# Patient Record
Sex: MALE | Race: WHITE | NOT HISPANIC OR LATINO | Employment: FULL TIME | URBAN - NONMETROPOLITAN AREA
[De-identification: names, ages, dates, MRNs, and addresses within clinical notes are randomized per-mention and may not be internally consistent; named-entity substitution may affect disease eponyms.]

---

## 2018-10-29 ENCOUNTER — OFFICE VISIT (OUTPATIENT)
Dept: OTOLARYNGOLOGY | Facility: CLINIC | Age: 63
End: 2018-10-29

## 2018-10-29 VITALS — RESPIRATION RATE: 17 BRPM | WEIGHT: 264.4 LBS | BODY MASS INDEX: 35.81 KG/M2 | HEIGHT: 72 IN

## 2018-10-29 DIAGNOSIS — C44.41 BASAL CELL CARCINOMA (BCC) OF SKIN OF NECK: Primary | ICD-10-CM

## 2018-10-29 PROCEDURE — 99243 OFF/OP CNSLTJ NEW/EST LOW 30: CPT | Performed by: OTOLARYNGOLOGY

## 2018-10-30 NOTE — PROGRESS NOTES
Subjective   Arnaud Cruz is a 63 y.o. male.   This is a consultation from Dr. Prem Blas  History of Present Illness   Patient has a skin lesion on his left neck that he says has been present for 2 months but was reportedly previously resected last year and then recurred.  Nothing in particular seems to have brought it back.  Underwent a biopsy at Dr. Blas's office (his records are available and are personally reviewed) and pathology revealed ulcerated basal cell carcinoma, nodular type, extending to the deep and peripheral margins.  Patient reports that since the biopsy there is still a crust on this site but is not bleeding or painful.      The following portions of the patient's history were reviewed and updated as appropriate: allergies, current medications, past family history, past medical history, past social history, past surgical history and problem list.      Arnaud Cruz reports that he has never smoked. He uses smokeless tobacco. He reports that he drinks alcohol. He reports that he does not use drugs.  Patient is not a tobacco user and has not been counseled for use of tobacco products    Family History   Problem Relation Age of Onset   • Hypertension Mother    • Cancer Mother         colon cancer   • Hypertension Father    • Cancer Father        Allergies   Allergen Reactions   • Penicillins Hives and Swelling         Current Outpatient Prescriptions:   •  HYDROcodone-acetaminophen (NORCO) 7.5-325 MG per tablet, Take 1 tablet by mouth 3 (Three) Times a Day As Needed for Moderate Pain ., Disp: , Rfl:   •  lisinopril (PRINIVIL,ZESTRIL) 20 MG tablet, Take 20 mg by mouth Daily., Disp: , Rfl:   •  ondansetron ODT (ZOFRAN-ODT) 4 MG disintegrating tablet, Take 1 tablet by mouth Every 6 (Six) Hours As Needed for Nausea or Vomiting., Disp: 12 tablet, Rfl: 0    Past Medical History:   Diagnosis Date   • Back pain    • Hypertension          Review of Systems   Respiratory: Positive for cough  and shortness of breath.    Musculoskeletal: Positive for arthralgias and back pain.   Hematological: Bruises/bleeds easily.   All other systems reviewed and are negative.          Objective   Physical Exam    General: Well-developed well-nourished male in no acute distress.  Alert and oriented ×3. Head: Normocephalic. Face: Symmetrical strength and appearance. PERRL. EOMI. Voice:Strong. Speech:Fluent  Ears: External ears no deformity, canals wax that is not addressed today.  Tympanic membranes are not visualized.  Nose: Nares show no discharge mass polyp or purulence.  Boggy mucosa is present.  No gross external deformity.  Septum: Midline  Oral cavity: Lips and gums without lesions.  Tongue and floor of mouth without lesions.  Parotid and submandibular ducts unobstructed.  No mucosal lesions on the buccal mucosa or vestibule of the mouth.  Pharynx: No erythema exudate mass or ulcer  Neck: No lymphadenopathy.  No thyromegaly.  Trachea and larynx midline.  No masses in the parotid or submandibular glands.  Skin: On the left neck, corresponding to the area indicated in Dr. Blas's notes, is a 1.3 x 1.5 cm biopsy site was central crusting suggestive of residual disease.    Assessment/Plan   Arnuad was seen today for skin lesion.    Diagnoses and all orders for this visit:    Basal cell carcinoma (BCC) of skin of neck      Plan:I have offered to perform excision of the skin lesion with frozen section margin control if indicated, and possible flap or skin graft if needed for closure.  I have explained the nature of this procedure to the patient in layman's terms including risks of bleeding, infection, poor healing, poor appearance, numbness, recurrence, and possible need for further treatment depending on final pathology.  Proposed benefit would be definitive treatment of the identified lesion.  The alternative would be observation which could result in continued or recurrent growth of the lesions.  Patient voices  understanding of all of the above and wishes to proceed.  This will be scheduled.    My thanks to Dr. Blas for this consultation

## 2018-11-05 ENCOUNTER — CONSULT (OUTPATIENT)
Dept: SURGERY | Facility: CLINIC | Age: 63
End: 2018-11-05

## 2018-11-05 VITALS
HEIGHT: 72 IN | SYSTOLIC BLOOD PRESSURE: 118 MMHG | BODY MASS INDEX: 36.08 KG/M2 | HEART RATE: 78 BPM | WEIGHT: 266.4 LBS | DIASTOLIC BLOOD PRESSURE: 82 MMHG | TEMPERATURE: 97.5 F

## 2018-11-05 DIAGNOSIS — C44.90 SKIN CANCER: Primary | ICD-10-CM

## 2018-11-05 PROCEDURE — 99203 OFFICE O/P NEW LOW 30 MIN: CPT | Performed by: SURGERY

## 2018-11-05 NOTE — PATIENT INSTRUCTIONS

## 2018-11-05 NOTE — PROGRESS NOTES
Subjective   Arnaud Cruz is a 63 y.o. male     History of Present Illness recently underwent a shave biopsy lesion on his left mid back by dermatology and was found to have a positive deep and peripheral margin.  This was nodular and infiltrative type.  Patient also had a basal cell cancer on his neck which she's seeing Dr. Middleton for excision.  Patient has had other skin cancers had one on his back excised a few years ago.        Review of Systems   Constitutional: Negative.    HENT: Negative.    Eyes: Negative.    Respiratory: Positive for shortness of breath.    Cardiovascular: Negative.    Gastrointestinal: Negative.    Endocrine: Negative.    Genitourinary: Negative.    Musculoskeletal: Positive for arthralgias and back pain.   Skin: Negative.    Allergic/Immunologic: Negative.    Neurological: Negative.    Hematological: Negative.    Psychiatric/Behavioral: Negative.      Past Medical History:   Diagnosis Date   • Back pain    • Hypertension      Past Surgical History:   Procedure Laterality Date   • BACK SURGERY     • HERNIA REPAIR       Family History   Problem Relation Age of Onset   • Hypertension Mother    • Cancer Mother         colon cancer   • Hypertension Father    • Cancer Father      Social History     Social History   • Marital status:      Spouse name: N/A   • Number of children: N/A   • Years of education: N/A     Occupational History   • Not on file.     Social History Main Topics   • Smoking status: Never Smoker   • Smokeless tobacco: Current User     Types: Snuff   • Alcohol use Yes      Comment: occasional    • Drug use: No   • Sexual activity: Not on file     Other Topics Concern   • Not on file     Social History Narrative   • No narrative on file     Allergies   Allergen Reactions   • Penicillins Hives and Swelling       Home Medications:  Prior to Admission medications    Medication Sig Start Date End Date Taking? Authorizing Provider   HYDROcodone-acetaminophen (NORCO)  7.5-325 MG per tablet Take 1 tablet by mouth 3 (Three) Times a Day As Needed for Moderate Pain .   Yes Emergency, Nurse BECKY De La Vega   lisinopril (PRINIVIL,ZESTRIL) 20 MG tablet Take 20 mg by mouth Daily.   Yes Emergency, Nurse Epic, RN   Omeprazole 20 MG Tablet Delayed Release Dispersible Take 20 mg by mouth Daily.   Yes Provider, MD Jimmy   ondansetron ODT (ZOFRAN-ODT) 4 MG disintegrating tablet Take 1 tablet by mouth Every 6 (Six) Hours As Needed for Nausea or Vomiting. 1/15/18   Juany Sanders PA-C       Objective   Physical Exam   Constitutional: He is oriented to person, place, and time. He appears well-developed and well-nourished. No distress.   HENT:   Head: Normocephalic and atraumatic.   Nose: Nose normal.   Eyes: Conjunctivae are normal.   Neck: Normal range of motion. No tracheal deviation present. No thyromegaly present.   Cardiovascular: Normal rate, regular rhythm and normal heart sounds.    No murmur heard.  Pulmonary/Chest: Effort normal and breath sounds normal. No respiratory distress. He has no wheezes. He has no rales. He exhibits no tenderness.   Abdominal: Soft. He exhibits no distension. There is no tenderness. There is no rebound and no guarding. No hernia.   Musculoskeletal: He exhibits no tenderness or deformity.   Neurological: He is alert and oriented to person, place, and time.   Skin: Skin is warm and dry. No rash noted.   Psychiatric: He has a normal mood and affect. His behavior is normal. Judgment and thought content normal.   Vitals reviewed.   no adenopathy in his left axilla or left groin  Has a 2.5 x 1 cm open wound on his left mid back      Assessment/Plan basal cell cancer shave biopsy with positive margins needs reexcision.  I fully discussed this with the patient doing this in the office versus the operating room and he wishes to the office under local.  He clearly understands the alternatives risk and benefits and wishes to proceed      The encounter diagnosis was Skin  cancer.                     This document has been electronically signed by Felipe De Oliveira MD on November 5, 2018 4:21 PM

## 2018-11-14 ENCOUNTER — PROCEDURE VISIT (OUTPATIENT)
Dept: SURGERY | Facility: CLINIC | Age: 63
End: 2018-11-14

## 2018-11-14 VITALS
TEMPERATURE: 98.2 F | HEART RATE: 84 BPM | WEIGHT: 265 LBS | BODY MASS INDEX: 35.89 KG/M2 | HEIGHT: 72 IN | DIASTOLIC BLOOD PRESSURE: 88 MMHG | SYSTOLIC BLOOD PRESSURE: 140 MMHG

## 2018-11-14 DIAGNOSIS — C44.519 BASAL CELL CARCINOMA OF BACK: Primary | ICD-10-CM

## 2018-11-14 PROCEDURE — 88305 TISSUE EXAM BY PATHOLOGIST: CPT | Performed by: SURGERY

## 2018-11-14 PROCEDURE — 88305 TISSUE EXAM BY PATHOLOGIST: CPT | Performed by: PATHOLOGY

## 2018-11-14 PROCEDURE — 11606 EXC TR-EXT MAL+MARG >4 CM: CPT | Performed by: SURGERY

## 2018-11-14 NOTE — PATIENT INSTRUCTIONS

## 2018-11-14 NOTE — PROGRESS NOTES
63-year-old male returns to have a basal cell cancer biopsy site on his back re-excise.  Patient wished to have it done here in the office stating under local.  We again went over with him what's involved along with alternatives risk and benefits and he clearly understands and wishes undergo reexcision of this biopsy site from his back.  The previous biopsy was done by dermatology and it had a positive at the deep and peripheral margin.  It was a nodular infiltrative type of basal cell cancer.    Patient was placed prone and the area was prepped and draped with the normal sterile fashion.  We infiltrated local good block was obtained.  We then excised an ellipse of skin with a scalpel full-thickness the skin down to the subcutaneous and came across normal subcutaneous with the scalpel at the deep level of the subcutaneous fat.  Specimen was handed off and sutures placed at the upper margin.  There was irrigated and hemostasis assured with light cautery we then closed the wound with interrupted 3-0 Vicryl simple stitches and a running 4-0 Monocryl subject her stitch.  Steri-Strips were applied patient was instructed in local wound care and he will follow up with us in the office in 2 weeks or sooner if he has a further concerns or questions.  The incision at the completion was 6 cm in length

## 2018-11-16 LAB
LAB AP CASE REPORT: NORMAL
LAB AP CLINICAL INFORMATION: NORMAL
PATH REPORT.FINAL DX SPEC: NORMAL
PATH REPORT.GROSS SPEC: NORMAL

## 2018-11-19 ENCOUNTER — PROCEDURE VISIT (OUTPATIENT)
Dept: OTOLARYNGOLOGY | Facility: CLINIC | Age: 63
End: 2018-11-19

## 2018-11-19 VITALS — HEIGHT: 72 IN | WEIGHT: 265 LBS | BODY MASS INDEX: 35.89 KG/M2 | OXYGEN SATURATION: 97 %

## 2018-11-19 DIAGNOSIS — C44.41 BASAL CELL CARCINOMA (BCC) OF SKIN OF NECK: Primary | ICD-10-CM

## 2018-11-19 PROCEDURE — 88332 PATH CONSLTJ SURG EA ADD BLK: CPT | Performed by: OTOLARYNGOLOGY

## 2018-11-19 PROCEDURE — 88332 PATH CONSLTJ SURG EA ADD BLK: CPT | Performed by: PATHOLOGY

## 2018-11-19 PROCEDURE — 88305 TISSUE EXAM BY PATHOLOGIST: CPT | Performed by: PATHOLOGY

## 2018-11-19 PROCEDURE — 88305 TISSUE EXAM BY PATHOLOGIST: CPT | Performed by: OTOLARYNGOLOGY

## 2018-11-19 PROCEDURE — 12042 INTMD RPR N-HF/GENIT2.6-7.5: CPT | Performed by: OTOLARYNGOLOGY

## 2018-11-19 PROCEDURE — 88331 PATH CONSLTJ SURG 1 BLK 1SPC: CPT | Performed by: OTOLARYNGOLOGY

## 2018-11-19 PROCEDURE — 88331 PATH CONSLTJ SURG 1 BLK 1SPC: CPT | Performed by: PATHOLOGY

## 2018-11-19 PROCEDURE — 11622 EXC S/N/H/F/G MAL+MRG 1.1-2: CPT | Performed by: OTOLARYNGOLOGY

## 2018-11-20 LAB
LAB AP CASE REPORT: NORMAL
Lab: NORMAL
PATH REPORT.FINAL DX SPEC: NORMAL
PATH REPORT.GROSS SPEC: NORMAL

## 2018-11-20 NOTE — PROGRESS NOTES
Procedure note    Preop diagnosis: Previously biopsied basal cell carcinoma of the left neck    Postop diagnosis: Same    Procedure: Excision of basal cell carcinoma left neck 1.7 cm margin with 5.0 cm layered closure    Surgeon: Dr. Middleton    Anesthesia: 1% Xylocaine with epinephrine    Description of procedure: The previous biopsy site in the left neck was identified and confirmed by review of Dr. Blas's notes and confirmed by the patient.  After again explaining the nature of the procedure to the patient in layman's terms including risks of bleeding, infection, poor healing, poor appearance, numbness, recurrence, possible need for further treatment depending on final pathology and the benefit of definitive treatment of a known malignancy and the alternative of observation informed consent was confirmed.  An elliptical incision was designed to follow pre-existing skin folds.  This was cleansed with alcohol and infiltrated 1% Xylocaine with epinephrine and prepped and draped sterilely.  Lesion was excised sharply, full-thickness, marked with a suture for orientation and sent to pathology for frozen section.  All awaiting frozen section the wound was thoroughly irrigated with saline and subcutaneous tissues were reapproximated with interrupted 4-0 Vicryl.  A saline soaked gauze dressing was applied to the wound follow awaiting frozen section.  Frozen section returned basal cell carcinoma with clear margins.  Wound was uncovered, further irrigated with saline, additional subcutaneous closure was accomplished with interrupted 5-0 Vicryl, and the skin was closed with running 4-0 nylon.  Bacitracin ointment was applied and procedure was terminated.  Patient tolerated the procedure well.  He was instructed local wound care and advised to return in 1 week for suture removal.

## 2018-11-27 ENCOUNTER — OFFICE VISIT (OUTPATIENT)
Dept: OTOLARYNGOLOGY | Facility: CLINIC | Age: 63
End: 2018-11-27

## 2018-11-27 VITALS — HEIGHT: 72 IN | OXYGEN SATURATION: 95 % | WEIGHT: 265 LBS | BODY MASS INDEX: 35.89 KG/M2

## 2018-11-27 DIAGNOSIS — Z48.817 AFTERCARE FOLLOWING SURGERY OF THE SKIN OR SUBCUTANEOUS TISSUE: Primary | ICD-10-CM

## 2018-11-27 PROCEDURE — 99024 POSTOP FOLLOW-UP VISIT: CPT | Performed by: OTOLARYNGOLOGY

## 2018-11-27 NOTE — PROGRESS NOTES
Subjective   Arnaud Cruz is a 63 y.o. male.       History of Present Illness   Patient is status post excision of a previously biopsied basal cell carcinoma of the left neck.  Final pathology showed clear margins.  He is having no specific issues.      The following portions of the patient's history were reviewed and updated as appropriate: allergies, current medications, past family history, past medical history, past social history, past surgical history and problem list.     reports that  has never smoked. His smokeless tobacco use includes snuff. He reports that he drinks alcohol. He reports that he does not use drugs.   Patient is not a tobacco user and has not been counseled for use of tobacco products      Review of Systems        Objective   Physical Exam  Incision intact, no evidence of infection.  All external sutures are removed      Assessment/Plan   Arnaud was seen today for post-op.    Diagnoses and all orders for this visit:    Aftercare following surgery of the skin or subcutaneous tissue        Plan: Sutures removed as described above.  May follow up with up with me as needed.

## 2018-11-28 ENCOUNTER — OFFICE VISIT (OUTPATIENT)
Dept: SURGERY | Facility: CLINIC | Age: 63
End: 2018-11-28

## 2018-11-28 VITALS
TEMPERATURE: 98 F | BODY MASS INDEX: 35.76 KG/M2 | HEART RATE: 78 BPM | WEIGHT: 264 LBS | HEIGHT: 72 IN | SYSTOLIC BLOOD PRESSURE: 124 MMHG | DIASTOLIC BLOOD PRESSURE: 70 MMHG

## 2018-11-28 DIAGNOSIS — C44.90 SKIN CANCER: Primary | ICD-10-CM

## 2018-11-28 PROCEDURE — 99024 POSTOP FOLLOW-UP VISIT: CPT | Performed by: SURGERY

## 2018-11-28 NOTE — PATIENT INSTRUCTIONS

## 2018-11-28 NOTE — PROGRESS NOTES
See prior note.  Patient returns after excision of a basal cell cancer from his back.  Lesion was completely excised margins were negative.  Final Diagnosis   SKIN, LEFT MIDDLE BACK, EXCISION:  BASAL CELL CARCINOMA, ULCERATED.  FIBROSIS AND FOREIGN BODY REACTION, CONSISTENT WITH RECENT BIOPSY.  MARGINS OF EXCISION FREE OF CARCINOMA.   Electronically signed by Arcadio Timmons MD on 11/16/2018 at 1403      Patient also was sent with another dermatology requests after he underwent biopsy of a basal cell cancer on his right shoulder by shave biopsy.  Deep and lateral margins were both positive patient needs to have this site reexcised.  He clearly identify the site.  I would recommend he have this site reexcised as well and he understands supple set him up to do this in the office under local.  Other lesions on his neck and face he does not have this addressed by Dr. Middleton from ENT

## 2018-11-30 ENCOUNTER — OFFICE VISIT (OUTPATIENT)
Dept: OTOLARYNGOLOGY | Facility: CLINIC | Age: 63
End: 2018-11-30

## 2018-11-30 VITALS — BODY MASS INDEX: 35.76 KG/M2 | WEIGHT: 264 LBS | OXYGEN SATURATION: 97 % | HEIGHT: 72 IN

## 2018-11-30 DIAGNOSIS — C44.320 SQUAMOUS CELL CARCINOMA OF SKIN OF FACE: Primary | ICD-10-CM

## 2018-11-30 PROCEDURE — 99213 OFFICE O/P EST LOW 20 MIN: CPT | Performed by: OTOLARYNGOLOGY

## 2018-11-30 NOTE — PROGRESS NOTES
Subjective   Arnaud Cruz is a 63 y.o. male.       History of Present Illness   Patient recently underwent excision of a basal cell carcinoma of the left neck.  Returns today with a report of biopsied squamous cell carcinoma of the right face.  Dr. Blas's notes are available and reviewed.  Patient reports the lesion is not bleeding or painful following the biopsy.  He is not sure how long the lesion and been there.      The following portions of the patient's history were reviewed and updated as appropriate: allergies, current medications, past family history, past medical history, past social history, past surgical history and problem list.     reports that  has never smoked. His smokeless tobacco use includes snuff. He reports that he drinks alcohol. He reports that he does not use drugs.   Patient is not a tobacco user and has not been counseled for use of tobacco products      Review of Systems   Constitutional: Negative for fever.           Objective   Physical Exam  Skin: Left neck incision is healing nicely with no evidence of infection.  Right face in the preauricular skin shows a 0.7 x 0.5 cm biopsy site.  This corresponds to the site noted in Dr. Blas's notes.      Assessment/Plan   Arnaud was seen today for skin lesion.    Diagnoses and all orders for this visit:    Squamous cell carcinoma of skin of face      Plan:I have offered to perform excision of the skin lesion with frozen section margin control if indicated, and possible flap or skin graft if needed for closure.  I have explained the nature of this procedure to the patient in layman's terms including risks of bleeding, infection, poor healing, poor appearance, numbness, recurrence, and possible need for further treatment depending on final pathology.  Proposed benefit would be definitive treatment of the identified lesions.  The alternative would be observation which could result in continued or recurrent growth of the lesion.  Patient  voices understanding of all of the above and wishes to proceed.  This will be scheduled.

## 2018-12-05 ENCOUNTER — PROCEDURE VISIT (OUTPATIENT)
Dept: OTOLARYNGOLOGY | Facility: CLINIC | Age: 63
End: 2018-12-05

## 2018-12-05 ENCOUNTER — PROCEDURE VISIT (OUTPATIENT)
Dept: SURGERY | Facility: CLINIC | Age: 63
End: 2018-12-05

## 2018-12-05 VITALS
HEIGHT: 72 IN | WEIGHT: 263 LBS | HEART RATE: 80 BPM | SYSTOLIC BLOOD PRESSURE: 126 MMHG | DIASTOLIC BLOOD PRESSURE: 80 MMHG | BODY MASS INDEX: 35.62 KG/M2 | TEMPERATURE: 97.9 F

## 2018-12-05 VITALS — BODY MASS INDEX: 35.62 KG/M2 | HEIGHT: 72 IN | WEIGHT: 263 LBS | OXYGEN SATURATION: 98 %

## 2018-12-05 DIAGNOSIS — C44.612 BASAL CELL CARCINOMA OF SHOULDER, RIGHT: Primary | ICD-10-CM

## 2018-12-05 DIAGNOSIS — C44.320 SQUAMOUS CELL CARCINOMA OF SKIN OF FACE: Primary | ICD-10-CM

## 2018-12-05 PROCEDURE — 88332 PATH CONSLTJ SURG EA ADD BLK: CPT | Performed by: OTOLARYNGOLOGY

## 2018-12-05 PROCEDURE — 12052 INTMD RPR FACE/MM 2.6-5.0 CM: CPT | Performed by: OTOLARYNGOLOGY

## 2018-12-05 PROCEDURE — 88332 PATH CONSLTJ SURG EA ADD BLK: CPT | Performed by: PATHOLOGY

## 2018-12-05 PROCEDURE — 11604 EXC TR-EXT MAL+MARG 3.1-4 CM: CPT | Performed by: SURGERY

## 2018-12-05 PROCEDURE — 88305 TISSUE EXAM BY PATHOLOGIST: CPT | Performed by: PATHOLOGY

## 2018-12-05 PROCEDURE — 88331 PATH CONSLTJ SURG 1 BLK 1SPC: CPT | Performed by: PATHOLOGY

## 2018-12-05 PROCEDURE — 11641 EXC F/E/E/N/L MAL+MRG 0.6-1: CPT | Performed by: OTOLARYNGOLOGY

## 2018-12-05 PROCEDURE — 88305 TISSUE EXAM BY PATHOLOGIST: CPT | Performed by: OTOLARYNGOLOGY

## 2018-12-05 PROCEDURE — 88305 TISSUE EXAM BY PATHOLOGIST: CPT | Performed by: SURGERY

## 2018-12-05 PROCEDURE — 88331 PATH CONSLTJ SURG 1 BLK 1SPC: CPT | Performed by: OTOLARYNGOLOGY

## 2018-12-05 NOTE — PATIENT INSTRUCTIONS

## 2018-12-05 NOTE — PROGRESS NOTES
See prior note.  Patient presents now to have a biopsy site on his right shoulder reexcised it was a basal cell cancer.  This was done by dermatology.  Patient went by dermatology this morning and a marked the site clearly.  Again went over the patient and would do this under local here in the office and he clearly understands and wishes to proceed.  Due to the orientation of the scar we did a transverse elliptical incision area we prepped the area with Betadine infiltrated local and a good block was obtained.  We then used a scalpel to incise full-thickness of the skin down to the subcutaneous fat and then came across subcutaneous fat with a scalpel and with the Bovie.  Bleeding was controlled with the Bovie as we did this.  Specimen was handed off.  We mobilized flaps proximally and distally with light cautery at the level of the subcutaneous just above the muscle.  We then approximated the wound with a few interrupted 4-0 Monocryl subject her stitches and then with 4-0 nylon simple stitches.  Dressing was applied patient was instructed in local wound care.  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  Patient will follow up with us in the office on Tuesday or sooner if he has a further concerns or questions.  At the completion the incision was 3.5 cm in length.

## 2018-12-06 NOTE — PROGRESS NOTES
Procedure note    Preop diagnosis: Previously biopsied squamous cell carcinoma the right face    Postop diagnosis: Same    Procedure: Excision of previously biopsied squamous cell carcinoma of the right face 0.9 cm margin with 2.8 cm layered closure    Surgeon: Dr. Middleton    Anesthesia: 1% Xylocaine with epinephrine    Description of procedure: After the previous biopsy site was identified and confirmed both by the patient and review of notes I once again discussed the nature of the procedure with the patient including risks of bleeding, infection, poor healing, poor appearance, numbness, recurrence, and possible need for further treatment planning on final pathology as well as proposed benefit of definitive treatment of biopsy-proven malignancy and the alternative of observation.  Patient voiced understanding and confirmed consent.  Skin around the lesion was cleansed with alcohol and elliptical incision was designed and infiltrated with 1% Xylocaine with epinephrine.  The skin was prepped and draped sterilely.  Lesion was excised sharply, full-thickness, marked with a suture for orientation and sent to pathology for frozen section.  While awaiting frozen section the wound was irrigated with saline and the subcutaneous tissues were closed with interrupted 5-0 Vicryl.  A saline soaked dressing was applied to the wound follow awaiting frozen section.  Frozen section revealed no residual tumor with solar elastosis and actinic keratosis present.  The wound was uncovered, irrigated with saline, and the skin was closed with running 5-0 nylon.  Bacitracin was applied and the procedure was terminated patient, procedure well.  He was instructed local wound care and advised to return in 1 week for suture removal.

## 2018-12-11 ENCOUNTER — OFFICE VISIT (OUTPATIENT)
Dept: SURGERY | Facility: CLINIC | Age: 63
End: 2018-12-11

## 2018-12-11 VITALS
HEIGHT: 72 IN | SYSTOLIC BLOOD PRESSURE: 142 MMHG | WEIGHT: 266.8 LBS | DIASTOLIC BLOOD PRESSURE: 84 MMHG | HEART RATE: 96 BPM | TEMPERATURE: 98 F | BODY MASS INDEX: 36.14 KG/M2

## 2018-12-11 DIAGNOSIS — C44.90 SKIN CANCER: Primary | ICD-10-CM

## 2018-12-11 LAB
LAB AP CASE REPORT: NORMAL
PATH REPORT.FINAL DX SPEC: NORMAL
PATH REPORT.GROSS SPEC: NORMAL

## 2018-12-11 PROCEDURE — 99024 POSTOP FOLLOW-UP VISIT: CPT | Performed by: SURGERY

## 2018-12-11 NOTE — PROGRESS NOTES
63-year-old male 1 week out from excision of a basal cell cancer excision site on his right shoulder.  Patient is doing well.  Pathology was no residual tumor present.  Went over pathology with the patient.  Wound appears to be intact.  We removed every other stitch.  Patient return to clinic in 1 week for removal rest stitches.

## 2018-12-11 NOTE — PATIENT INSTRUCTIONS

## 2018-12-12 ENCOUNTER — OFFICE VISIT (OUTPATIENT)
Dept: OTOLARYNGOLOGY | Facility: CLINIC | Age: 63
End: 2018-12-12

## 2018-12-12 VITALS — BODY MASS INDEX: 36.03 KG/M2 | WEIGHT: 266 LBS | HEIGHT: 72 IN | RESPIRATION RATE: 17 BRPM

## 2018-12-12 DIAGNOSIS — Z48.817 AFTERCARE FOLLOWING SURGERY OF THE SKIN OR SUBCUTANEOUS TISSUE: Primary | ICD-10-CM

## 2018-12-12 PROCEDURE — 99024 POSTOP FOLLOW-UP VISIT: CPT | Performed by: OTOLARYNGOLOGY

## 2018-12-12 NOTE — PROGRESS NOTES
Subjective   Arnaud Cruz is a 63 y.o. male.       History of Present Illness   Patient is now 1 week status post excision of a previously biopsied squamous cell carcinoma the right face.  Final pathology showed actinic keratosis and marked solar elastosis with evidence of a previous biopsy site but no residual tumor.  Patient states he is having no specific complaints.      The following portions of the patient's history were reviewed and updated as appropriate: allergies, current medications, past family history, past medical history, past social history, past surgical history and problem list.     reports that  has never smoked. His smokeless tobacco use includes snuff. He reports that he drinks alcohol. He reports that he does not use drugs.   Patient is not a tobacco user and has not been counseled for use of tobacco products      Review of Systems        Objective   Physical Exam  Incision on the right face is intact with no evidence of infection.  All external sutures are removed.  Facial nerve function is intact.      Assessment/Plan   Arnaud was seen today for post-op.    Diagnoses and all orders for this visit:    Aftercare following surgery of the skin or subcutaneous tissue        Plan: Sutures removed as described above.  May follow up with me as needed

## 2018-12-18 ENCOUNTER — OFFICE VISIT (OUTPATIENT)
Dept: SURGERY | Facility: CLINIC | Age: 63
End: 2018-12-18

## 2018-12-18 VITALS
HEART RATE: 88 BPM | DIASTOLIC BLOOD PRESSURE: 82 MMHG | WEIGHT: 266.2 LBS | SYSTOLIC BLOOD PRESSURE: 126 MMHG | TEMPERATURE: 97.8 F | HEIGHT: 72 IN | BODY MASS INDEX: 36.06 KG/M2

## 2018-12-18 DIAGNOSIS — C44.612 BASAL CELL CARCINOMA OF SHOULDER, RIGHT: Primary | ICD-10-CM

## 2018-12-18 PROCEDURE — 99212 OFFICE O/P EST SF 10 MIN: CPT | Performed by: SURGERY

## 2018-12-18 NOTE — PROGRESS NOTES
63-year-old male now 2 weeks out from reexcision of a basal cell cancer from his right shoulder.  Patient returns to the rest of the sutures removed.  He has no complaints.  Wound is intact.  Remove the rest of the sutures.  I have already gone over the pathology with the patient and he clearly understands.  Patient will follow up with dermatology regarding future skin checks and follow up with us on a when necessary basis

## 2023-09-29 NOTE — PATIENT INSTRUCTIONS
